# Patient Record
Sex: FEMALE | Race: BLACK OR AFRICAN AMERICAN | Employment: UNEMPLOYED | URBAN - METROPOLITAN AREA
[De-identification: names, ages, dates, MRNs, and addresses within clinical notes are randomized per-mention and may not be internally consistent; named-entity substitution may affect disease eponyms.]

---

## 2018-04-07 ENCOUNTER — APPOINTMENT (OUTPATIENT)
Dept: OCCUPATIONAL MEDICINE | Age: 23
End: 2018-04-07
Attending: FAMILY MEDICINE

## 2018-10-06 ENCOUNTER — HOSPITAL ENCOUNTER (EMERGENCY)
Facility: HOSPITAL | Age: 23
Discharge: LEFT BEFORE DISCHARGE | End: 2018-10-06
Attending: EMERGENCY MEDICINE
Payer: COMMERCIAL

## 2018-10-06 VITALS
HEIGHT: 62 IN | SYSTOLIC BLOOD PRESSURE: 134 MMHG | OXYGEN SATURATION: 96 % | TEMPERATURE: 99 F | HEART RATE: 89 BPM | DIASTOLIC BLOOD PRESSURE: 99 MMHG | BODY MASS INDEX: 40.12 KG/M2 | WEIGHT: 218 LBS | RESPIRATION RATE: 18 BRPM

## 2018-10-06 NOTE — ED INITIAL ASSESSMENT (HPI)
Pt reports that she had a positive preg test 3 months ago has not had any follow up with OB.  Reports vag bleeding for the past week

## 2018-10-09 ENCOUNTER — HOSPITAL ENCOUNTER (OUTPATIENT)
Facility: HOSPITAL | Age: 23
Setting detail: OBSERVATION
Discharge: HOME | End: 2018-10-09
Attending: OBSTETRICS & GYNECOLOGY | Admitting: OBSTETRICS & GYNECOLOGY
Payer: COMMERCIAL

## 2018-10-09 VITALS — DIASTOLIC BLOOD PRESSURE: 67 MMHG | HEART RATE: 84 BPM | SYSTOLIC BLOOD PRESSURE: 128 MMHG

## 2018-10-09 PROBLEM — N93.9 VAGINAL BLEEDING: Status: ACTIVE | Noted: 2018-10-09

## 2018-10-09 PROCEDURE — 99201 HC OUTPT EVAL AND MGNT NEW PT LEVEL 1: CPT

## 2018-10-10 NOTE — PROGRESS NOTES
Dr Herb Montes in to see pt. Bedside ultrasound done. FHT's 160 bpm. No bleeding noted. Per bedside ultrasound, Dr Herb Montes determining pt to be approx 12 weeks. Instructions to discharge to ER for further workup.  Pt discharged per wheelchair to ER

## 2018-10-10 NOTE — PROGRESS NOTES
[facility] - [facility]      Triage Note    Marylee Kocher Patient Status:  Observation    1995 MRN [id number]   Location [address] Attending No att. providers found   [facility] Day # 0 PCP No primary care provider on

## 2018-10-10 NOTE — PROGRESS NOTES
Pt arrived from ER with c/o vaginal bleeding. Pt states LMP 5/10/18 and that she has had very limited prenatal care.

## 2018-10-10 NOTE — TRIAGE
[facility]      Triage Note    Eddie Hyman Patient Status:  Observation    1995 MRN [id number]   Location [address]  Attending Raissa Smith MD   Hosp Day # 0 PCP No primary care provider on file

## 2019-08-17 ENCOUNTER — OFFICE VISIT (OUTPATIENT)
Dept: FAMILY MEDICINE CLINIC | Facility: CLINIC | Age: 24
End: 2019-08-17
Payer: MEDICAID

## 2019-08-17 VITALS
HEIGHT: 64 IN | WEIGHT: 218 LBS | BODY MASS INDEX: 37.22 KG/M2 | HEART RATE: 105 BPM | TEMPERATURE: 97 F | OXYGEN SATURATION: 98 % | DIASTOLIC BLOOD PRESSURE: 90 MMHG | SYSTOLIC BLOOD PRESSURE: 122 MMHG

## 2019-08-17 DIAGNOSIS — R30.0 DYSURIA: ICD-10-CM

## 2019-08-17 DIAGNOSIS — R82.90 ABNORMAL URINE ODOR: Primary | ICD-10-CM

## 2019-08-17 LAB
APPEARANCE: CLEAR
GLUCOSE (URINE DIPSTICK): NEGATIVE MG/DL
KETONES (URINE DIPSTICK): 15 MG/DL
MULTISTIX LOT#: ABNORMAL NUMERIC
NITRITE, URINE: NEGATIVE
OCCULT BLOOD: NEGATIVE
PH, URINE: 6 (ref 4.5–8)
PROTEIN (URINE DIPSTICK): 30 MG/DL
SPECIFIC GRAVITY: 1.02 (ref 1–1.03)
UROBILINOGEN,SEMI-QN: 0.2 MG/DL (ref 0–1.9)

## 2019-08-17 PROCEDURE — 87491 CHLMYD TRACH DNA AMP PROBE: CPT | Performed by: NURSE PRACTITIONER

## 2019-08-17 PROCEDURE — 99202 OFFICE O/P NEW SF 15 MIN: CPT | Performed by: NURSE PRACTITIONER

## 2019-08-17 PROCEDURE — 87086 URINE CULTURE/COLONY COUNT: CPT | Performed by: NURSE PRACTITIONER

## 2019-08-17 PROCEDURE — 87591 N.GONORRHOEAE DNA AMP PROB: CPT | Performed by: NURSE PRACTITIONER

## 2019-08-17 RX ORDER — SULFAMETHOXAZOLE AND TRIMETHOPRIM 800; 160 MG/1; MG/1
1 TABLET ORAL 2 TIMES DAILY
Qty: 14 TABLET | Refills: 0 | Status: SHIPPED | OUTPATIENT
Start: 2019-08-17 | End: 2019-08-24

## 2019-08-17 RX ORDER — PHENTERMINE HYDROCHLORIDE 37.5 MG/1
37.5 CAPSULE ORAL EVERY MORNING
COMMUNITY

## 2019-08-17 NOTE — PATIENT INSTRUCTIONS
Dysuria     Painful urination (dysuria) is often caused by a problem in the urinary tract. Dysuria is pain felt during urination. It is often described as a burning. Learn more about this problem and how it can be treated. What causes dysuria?   Possib · Rash or joint pain  · Increased back or abdominal pain  · Enlarged painful lymph nodes (lumps) in the groinTh   Date Last Reviewed: 1/1/2017  © 7701-8488 [employer]. [address] [facility], [address] [place] [address]. All rights reserved.  This info

## 2019-08-17 NOTE — PROGRESS NOTES
CHIEF COMPLAINT:   Patient presents with:  UTI: smells when she urinates, some discharge,  discomfort when urinating      HPI:   Cassandra Rodriges is a 24year old female who presents with symptoms of possible UTI.   Pt is C/O odor- she is unsure if coming LUNGS: denies shortness of breath, cough, or wheezing  GI: See HPI. No N/V/C/D. : See HPI. NEURO: no headaches. EXAM:   /90   Pulse 105   Temp 97.3 °F (36.3 °C) (Tympanic)   Ht 64\"   Wt 218 lb   LMP 07/22/2019   SpO2 98%   Breastfeeding?  No - Advised F/U visit with her GYN or IC if no improvement/worsening within 2-3 days.    - Advised ER if ever moderate to severe abdominal/flank pain or new fevers. - Pt verbalizes understanding and is agreeable w/ plan.     Meds & Refills for this Visit:  R How is dysuria treated? Treatment depends on the cause. If you have a bacterial infection, you may need antibiotics. You may be given medicines to make it easier for you to urinate and help relieve pain.  Your healthcare provider can tell you more about yo

## 2019-08-18 LAB
C TRACH DNA SPEC QL NAA+PROBE: NEGATIVE
N GONORRHOEA DNA SPEC QL NAA+PROBE: NEGATIVE

## 2023-05-24 ENCOUNTER — APPOINTMENT (OUTPATIENT)
Dept: GENERAL RADIOLOGY | Facility: HOSPITAL | Age: 28
End: 2023-05-24
Attending: EMERGENCY MEDICINE
Payer: COMMERCIAL

## 2023-05-24 ENCOUNTER — APPOINTMENT (OUTPATIENT)
Dept: CT IMAGING | Facility: HOSPITAL | Age: 28
End: 2023-05-24
Attending: EMERGENCY MEDICINE
Payer: COMMERCIAL

## 2023-05-24 ENCOUNTER — HOSPITAL ENCOUNTER (EMERGENCY)
Facility: HOSPITAL | Age: 28
Discharge: HOME | End: 2023-05-24
Attending: EMERGENCY MEDICINE
Payer: COMMERCIAL

## 2023-05-24 VITALS
SYSTOLIC BLOOD PRESSURE: 144 MMHG | TEMPERATURE: 97 F | HEART RATE: 84 BPM | BODY MASS INDEX: 37.54 KG/M2 | OXYGEN SATURATION: 99 % | HEIGHT: 62 IN | RESPIRATION RATE: 16 BRPM | DIASTOLIC BLOOD PRESSURE: 97 MMHG | WEIGHT: 204 LBS

## 2023-05-24 DIAGNOSIS — S00.81XA ABRASION OF FACE, INITIAL ENCOUNTER: ICD-10-CM
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
## 2023-05-24 PROCEDURE — 99284 EMERGENCY DEPT VISIT MOD MDM: CPT

## 2023-05-24 PROCEDURE — 73030 X-RAY EXAM OF SHOULDER: CPT | Performed by: EMERGENCY MEDICINE

## 2023-05-24 PROCEDURE — 72125 CT NECK SPINE W/O DYE: CPT | Performed by: EMERGENCY MEDICINE

## 2023-05-24 PROCEDURE — 72072 X-RAY EXAM THORAC SPINE 3VWS: CPT | Performed by: EMERGENCY MEDICINE

## 2023-05-24 PROCEDURE — 73590 X-RAY EXAM OF LOWER LEG: CPT | Performed by: EMERGENCY MEDICINE

## 2023-05-24 PROCEDURE — 73080 X-RAY EXAM OF ELBOW: CPT | Performed by: EMERGENCY MEDICINE

## 2023-05-24 PROCEDURE — 70450 CT HEAD/BRAIN W/O DYE: CPT | Performed by: EMERGENCY MEDICINE

## 2023-05-24 RX ORDER — HYDROCODONE BITARTRATE AND ACETAMINOPHEN 5; 325 MG/1; MG/1
1 TABLET ORAL ONCE
Status: COMPLETED | OUTPATIENT
Start: 2023-05-24 | End: 2023-05-24

## 2023-05-24 RX ORDER — HYDROCODONE BITARTRATE AND ACETAMINOPHEN 5; 325 MG/1; MG/1
1 TABLET ORAL EVERY 6 HOURS PRN
Qty: 15 TABLET | Refills: 0 | Status: SHIPPED | OUTPATIENT
Start: 2023-05-24 | End: 2023-05-31
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
## 2023-05-24 NOTE — DISCHARGE INSTRUCTIONS
Wash abrasions gently with regular soap and water. Hydrocodone for pain as needed, do not work or drive when taking as it can make you drowsy. Wear sling for comfort.

## 2023-05-29 ENCOUNTER — HOSPITAL ENCOUNTER (EMERGENCY)
Facility: HOSPITAL | Age: 28
Discharge: HOME | End: 2023-05-29
Attending: EMERGENCY MEDICINE
Payer: MEDICAID

## 2023-05-29 VITALS
OXYGEN SATURATION: 98 % | BODY MASS INDEX: 37.91 KG/M2 | DIASTOLIC BLOOD PRESSURE: 70 MMHG | WEIGHT: 206 LBS | TEMPERATURE: 98 F | SYSTOLIC BLOOD PRESSURE: 162 MMHG | HEART RATE: 80 BPM | HEIGHT: 62 IN | RESPIRATION RATE: 20 BRPM

## 2023-05-29 DIAGNOSIS — M62.830 LUMBAR PARASPINAL MUSCLE SPASM: ICD-10-CM

## 2023-05-29 DIAGNOSIS — M62.830 SPASM OF THORACIC BACK MUSCLE: Primary | ICD-10-CM

## 2023-05-29 PROCEDURE — 99284 EMERGENCY DEPT VISIT MOD MDM: CPT

## 2023-05-29 PROCEDURE — 99283 EMERGENCY DEPT VISIT LOW MDM: CPT

## 2023-05-29 RX ORDER — IBUPROFEN 200 MG
400 TABLET ORAL ONCE
Status: COMPLETED | OUTPATIENT
Start: 2023-05-29 | End: 2023-05-29

## 2023-05-29 RX ORDER — ACETAMINOPHEN 500 MG
1000 TABLET ORAL ONCE
Status: COMPLETED | OUTPATIENT
Start: 2023-05-29 | End: 2023-05-29

## 2023-05-29 RX ORDER — CYCLOBENZAPRINE HCL 10 MG
10 TABLET ORAL 3 TIMES DAILY PRN
Qty: 10 TABLET | Refills: 0 | Status: SHIPPED | OUTPATIENT
Start: 2023-05-29

## 2023-05-29 NOTE — DISCHARGE INSTRUCTIONS
Warm compresses topically to your back 20 minutes every 2 hours while awake. Avoid heating pads that can burn the skin  Ibuprofen 400 mg 3 times a day with food for 3 to 5 days, alternate with 1 g of Tylenol twice daily  Flexeril for muscle spasm. Do not drink alcohol or drive while on Flexeril may make you drowsy  Return if worse  Follow up with primary care physician, I contacted our  who will assist you in finding physician you can follow-up with. You may need some physical therapy.

## 2023-05-30 NOTE — CM/SW NOTE
Called in script for cyclobenzaprine since [facility] pharmacy called for patient since they did not receive anything electronically. Cyclobenzaprine 10 mg Take 1 tablet (10 mg total) by mouth 3 (three) times daily as needed for Muscle spasms. No refills.

## 2023-05-30 NOTE — CM/SW NOTE
Left message for Paula Giles at the [facility] to follow up with patient for a primary care appointment.

## 2023-06-01 ENCOUNTER — ORDER TRANSCRIPTION (OUTPATIENT)
Dept: PHYSICAL THERAPY | Facility: HOSPITAL | Age: 28
End: 2023-06-01

## 2023-06-01 DIAGNOSIS — M25.522 LEFT ELBOW PAIN: Primary | ICD-10-CM

## 2023-06-01 DIAGNOSIS — M54.42 ACUTE BACK PAIN WITH SCIATICA, LEFT: ICD-10-CM

## 2023-06-01 DIAGNOSIS — M62.838 SPASM OF MUSCLE: ICD-10-CM

## 2023-06-08 ENCOUNTER — TELEPHONE (OUTPATIENT)
Dept: PHYSICAL THERAPY | Facility: HOSPITAL | Age: 28
End: 2023-06-08

## 2023-07-28 ENCOUNTER — APPOINTMENT (OUTPATIENT)
Dept: ULTRASOUND IMAGING | Facility: HOSPITAL | Age: 28
End: 2023-07-28
Attending: EMERGENCY MEDICINE
Payer: MEDICAID

## 2023-07-28 ENCOUNTER — HOSPITAL ENCOUNTER (EMERGENCY)
Facility: HOSPITAL | Age: 28
Discharge: HOME | End: 2023-07-28
Attending: EMERGENCY MEDICINE
Payer: MEDICAID

## 2023-07-28 VITALS
HEART RATE: 74 BPM | HEIGHT: 62 IN | WEIGHT: 226 LBS | TEMPERATURE: 98 F | SYSTOLIC BLOOD PRESSURE: 103 MMHG | OXYGEN SATURATION: 100 % | DIASTOLIC BLOOD PRESSURE: 62 MMHG | RESPIRATION RATE: 15 BRPM | BODY MASS INDEX: 41.59 KG/M2

## 2023-07-28 DIAGNOSIS — R10.2 PELVIC PAIN IN PREGNANCY: Primary | ICD-10-CM

## 2023-07-28 DIAGNOSIS — O26.899 PELVIC PAIN IN PREGNANCY: Primary | ICD-10-CM

## 2023-07-28 DIAGNOSIS — N39.0 URINARY TRACT INFECTION WITHOUT HEMATURIA, SITE UNSPECIFIED: ICD-10-CM

## 2023-07-28 PROBLEM — O09.291 HISTORY OF PRE-ECLAMPSIA IN PRIOR PREGNANCY, CURRENTLY PREGNANT IN FIRST TRIMESTER (HCC): Status: ACTIVE | Noted: 2023-07-28

## 2023-07-28 PROBLEM — E66.01 MATERNAL MORBID OBESITY IN FIRST TRIMESTER, ANTEPARTUM (HCC): Status: ACTIVE | Noted: 2023-07-28

## 2023-07-28 PROBLEM — Z34.81 PRENATAL CARE, SUBSEQUENT PREGNANCY IN FIRST TRIMESTER (HCC): Status: ACTIVE | Noted: 2023-07-28

## 2023-07-28 PROBLEM — O99.211 MATERNAL MORBID OBESITY IN FIRST TRIMESTER, ANTEPARTUM (HCC): Status: ACTIVE | Noted: 2023-07-28

## 2023-07-28 PROBLEM — Z34.81 PRENATAL CARE, SUBSEQUENT PREGNANCY IN FIRST TRIMESTER: Status: ACTIVE | Noted: 2023-07-28

## 2023-07-28 PROBLEM — O09.291 HISTORY OF PRE-ECLAMPSIA IN PRIOR PREGNANCY, CURRENTLY PREGNANT IN FIRST TRIMESTER: Status: ACTIVE | Noted: 2023-07-28

## 2023-07-28 PROBLEM — N93.9 VAGINAL BLEEDING: Status: RESOLVED | Noted: 2018-10-09 | Resolved: 2023-07-28

## 2023-07-28 PROBLEM — O36.80X0 PREGNANCY WITH UNCERTAIN FETAL VIABILITY (HCC): Status: ACTIVE | Noted: 2023-07-28

## 2023-07-28 PROBLEM — O34.219 PREGNANCY WITH HISTORY OF CESAREAN SECTION, ANTEPARTUM (HCC): Status: ACTIVE | Noted: 2023-07-28

## 2023-07-28 PROBLEM — O34.219 PREGNANCY WITH HISTORY OF CESAREAN SECTION, ANTEPARTUM: Status: ACTIVE | Noted: 2023-07-28

## 2023-07-28 PROBLEM — O36.80X0 PREGNANCY WITH UNCERTAIN FETAL VIABILITY: Status: ACTIVE | Noted: 2023-07-28

## 2023-07-28 LAB
ANION GAP SERPL CALC-SCNC: 6 MMOL/L (ref 0–18)
B-HCG SERPL-ACNC: 3639 MIU/ML
B-HCG UR QL: POSITIVE
BASOPHILS # BLD AUTO: 0.03 X10(3) UL (ref 0–0.2)
BASOPHILS NFR BLD AUTO: 0.3 %
BILIRUB UR QL STRIP.AUTO: NEGATIVE
BUN BLD-MCNC: 8 MG/DL (ref 7–18)
CALCIUM BLD-MCNC: 9 MG/DL (ref 8.5–10.1)
CHLORIDE SERPL-SCNC: 109 MMOL/L (ref 98–112)
CO2 SERPL-SCNC: 23 MMOL/L (ref 21–32)
COLOR UR AUTO: YELLOW
CREAT BLD-MCNC: 0.72 MG/DL
EGFRCR SERPLBLD CKD-EPI 2021: 117 ML/MIN/1.73M2 (ref 60–?)
EOSINOPHIL # BLD AUTO: 0.1 X10(3) UL (ref 0–0.7)
EOSINOPHIL NFR BLD AUTO: 1.1 %
ERYTHROCYTE [DISTWIDTH] IN BLOOD BY AUTOMATED COUNT: 14.6 %
GLUCOSE BLD-MCNC: 94 MG/DL (ref 70–99)
GLUCOSE UR STRIP.AUTO-MCNC: NEGATIVE MG/DL
HCT VFR BLD AUTO: 36.8 %
HGB BLD-MCNC: 12.7 G/DL
HYALINE CASTS #/AREA URNS AUTO: PRESENT /LPF
IMM GRANULOCYTES # BLD AUTO: 0.05 X10(3) UL (ref 0–1)
IMM GRANULOCYTES NFR BLD: 0.5 %
KETONES UR STRIP.AUTO-MCNC: NEGATIVE MG/DL
LYMPHOCYTES # BLD AUTO: 2.29 X10(3) UL (ref 1–4)
LYMPHOCYTES NFR BLD AUTO: 24.7 %
MCH RBC QN AUTO: 27.2 PG (ref 26–34)
MCHC RBC AUTO-ENTMCNC: 34.5 G/DL (ref 31–37)
MCV RBC AUTO: 78.8 FL
MONOCYTES # BLD AUTO: 0.61 X10(3) UL (ref 0.1–1)
MONOCYTES NFR BLD AUTO: 6.6 %
NEUTROPHILS # BLD AUTO: 6.19 X10 (3) UL (ref 1.5–7.7)
NEUTROPHILS # BLD AUTO: 6.19 X10(3) UL (ref 1.5–7.7)
NEUTROPHILS NFR BLD AUTO: 66.8 %
NITRITE UR QL STRIP.AUTO: POSITIVE
OSMOLALITY SERPL CALC.SUM OF ELEC: 284 MOSM/KG (ref 275–295)
PH UR STRIP.AUTO: 5 [PH] (ref 5–8)
PLATELET # BLD AUTO: 343 10(3)UL (ref 150–450)
POTASSIUM SERPL-SCNC: 3.7 MMOL/L (ref 3.5–5.1)
PROT UR STRIP.AUTO-MCNC: 30 MG/DL
RBC # BLD AUTO: 4.67 X10(6)UL
RBC #/AREA URNS AUTO: >10 /HPF
RBC UR QL AUTO: NEGATIVE
RH BLOOD TYPE: POSITIVE
SODIUM SERPL-SCNC: 138 MMOL/L (ref 136–145)
SP GR UR STRIP.AUTO: 1.02 (ref 1–1.03)
UROBILINOGEN UR STRIP.AUTO-MCNC: <2 MG/DL
WBC # BLD AUTO: 9.3 X10(3) UL (ref 4–11)
WBC #/AREA URNS AUTO: >50 /HPF

## 2023-07-28 PROCEDURE — 96360 HYDRATION IV INFUSION INIT: CPT

## 2023-07-28 PROCEDURE — 86901 BLOOD TYPING SEROLOGIC RH(D): CPT | Performed by: EMERGENCY MEDICINE

## 2023-07-28 PROCEDURE — 87077 CULTURE AEROBIC IDENTIFY: CPT | Performed by: EMERGENCY MEDICINE

## 2023-07-28 PROCEDURE — 87186 SC STD MICRODIL/AGAR DIL: CPT | Performed by: EMERGENCY MEDICINE

## 2023-07-28 PROCEDURE — 76801 OB US < 14 WKS SINGLE FETUS: CPT | Performed by: EMERGENCY MEDICINE

## 2023-07-28 PROCEDURE — 81001 URINALYSIS AUTO W/SCOPE: CPT | Performed by: EMERGENCY MEDICINE

## 2023-07-28 PROCEDURE — 99285 EMERGENCY DEPT VISIT HI MDM: CPT

## 2023-07-28 PROCEDURE — 87086 URINE CULTURE/COLONY COUNT: CPT | Performed by: EMERGENCY MEDICINE

## 2023-07-28 PROCEDURE — 86900 BLOOD TYPING SEROLOGIC ABO: CPT | Performed by: EMERGENCY MEDICINE

## 2023-07-28 PROCEDURE — 85025 COMPLETE CBC W/AUTO DIFF WBC: CPT | Performed by: EMERGENCY MEDICINE

## 2023-07-28 PROCEDURE — 99284 EMERGENCY DEPT VISIT MOD MDM: CPT

## 2023-07-28 PROCEDURE — 80048 BASIC METABOLIC PNL TOTAL CA: CPT | Performed by: EMERGENCY MEDICINE

## 2023-07-28 PROCEDURE — 81025 URINE PREGNANCY TEST: CPT

## 2023-07-28 PROCEDURE — 84702 CHORIONIC GONADOTROPIN TEST: CPT | Performed by: EMERGENCY MEDICINE

## 2023-07-28 RX ORDER — ONDANSETRON 4 MG/1
4 TABLET, ORALLY DISINTEGRATING ORAL ONCE
Status: DISCONTINUED | OUTPATIENT
Start: 2023-07-28 | End: 2023-07-28

## 2023-07-28 RX ORDER — CEPHALEXIN 500 MG/1
500 CAPSULE ORAL 4 TIMES DAILY
Qty: 40 CAPSULE | Refills: 0 | Status: SHIPPED | OUTPATIENT
Start: 2023-07-28 | End: 2023-07-28

## 2023-07-28 RX ORDER — CEPHALEXIN 500 MG/1
500 CAPSULE ORAL 4 TIMES DAILY
Qty: 40 CAPSULE | Refills: 0 | Status: SHIPPED | OUTPATIENT
Start: 2023-07-28 | End: 2023-08-07

## 2023-07-28 NOTE — ED INITIAL ASSESSMENT (HPI)
Patient was in [facility] on may 25th. Placed on pain medications @ that time. Patient complains of generalized pain & dizziness.

## 2023-07-29 NOTE — ED QUICK NOTES
Pt reevaluated by er physician. Pt informed of her test report and plan of care.  Verbalizing undersatanding

## 2023-08-01 ENCOUNTER — TELEPHONE (OUTPATIENT)
Facility: CLINIC | Age: 28
End: 2023-08-01

## 2023-08-01 NOTE — TELEPHONE ENCOUNTER
Called the pt to verify a follow up appt has been scheduled after ER visit. Pt confirmed the appt has been scheduled with her provider.

## 2023-08-15 PROCEDURE — 99284 EMERGENCY DEPT VISIT MOD MDM: CPT

## 2023-08-15 PROCEDURE — 99285 EMERGENCY DEPT VISIT HI MDM: CPT

## 2023-08-16 ENCOUNTER — HOSPITAL ENCOUNTER (EMERGENCY)
Facility: HOSPITAL | Age: 28
Discharge: HOME | End: 2023-08-16
Attending: EMERGENCY MEDICINE
Payer: MEDICAID

## 2023-08-16 ENCOUNTER — APPOINTMENT (OUTPATIENT)
Dept: ULTRASOUND IMAGING | Facility: HOSPITAL | Age: 28
End: 2023-08-16
Attending: EMERGENCY MEDICINE
Payer: MEDICAID

## 2023-08-16 VITALS
OXYGEN SATURATION: 97 % | TEMPERATURE: 98 F | BODY MASS INDEX: 50 KG/M2 | SYSTOLIC BLOOD PRESSURE: 136 MMHG | WEIGHT: 273 LBS | HEART RATE: 99 BPM | RESPIRATION RATE: 20 BRPM | DIASTOLIC BLOOD PRESSURE: 81 MMHG

## 2023-08-16 DIAGNOSIS — O03.9 MISCARRIAGE: Primary | ICD-10-CM

## 2023-08-16 LAB
ALBUMIN SERPL-MCNC: 3.7 G/DL (ref 3.4–5)
ALBUMIN/GLOB SERPL: 0.9 {RATIO} (ref 1–2)
ALP LIVER SERPL-CCNC: 118 U/L
ALT SERPL-CCNC: 24 U/L
ANION GAP SERPL CALC-SCNC: 4 MMOL/L (ref 0–18)
AST SERPL-CCNC: 16 U/L (ref 15–37)
B-HCG SERPL-ACNC: 90 MIU/ML
B-HCG UR QL: POSITIVE
BASOPHILS # BLD AUTO: 0.03 X10(3) UL (ref 0–0.2)
BASOPHILS NFR BLD AUTO: 0.3 %
BILIRUB SERPL-MCNC: 0.2 MG/DL (ref 0.1–2)
BILIRUB UR QL STRIP.AUTO: NEGATIVE
BUN BLD-MCNC: 12 MG/DL (ref 7–18)
CALCIUM BLD-MCNC: 9.1 MG/DL (ref 8.5–10.1)
CHLORIDE SERPL-SCNC: 110 MMOL/L (ref 98–112)
CO2 SERPL-SCNC: 24 MMOL/L (ref 21–32)
COLOR UR AUTO: YELLOW
CREAT BLD-MCNC: 0.88 MG/DL
EGFRCR SERPLBLD CKD-EPI 2021: 92 ML/MIN/1.73M2 (ref 60–?)
EOSINOPHIL # BLD AUTO: 0.11 X10(3) UL (ref 0–0.7)
EOSINOPHIL NFR BLD AUTO: 1.1 %
ERYTHROCYTE [DISTWIDTH] IN BLOOD BY AUTOMATED COUNT: 14.2 %
GLOBULIN PLAS-MCNC: 4 G/DL (ref 2.8–4.4)
GLUCOSE BLD-MCNC: 106 MG/DL (ref 70–99)
GLUCOSE UR STRIP.AUTO-MCNC: NORMAL MG/DL
HCT VFR BLD AUTO: 36.5 %
HGB BLD-MCNC: 12.8 G/DL
IMM GRANULOCYTES # BLD AUTO: 0.03 X10(3) UL (ref 0–1)
IMM GRANULOCYTES NFR BLD: 0.3 %
KETONES UR STRIP.AUTO-MCNC: NEGATIVE MG/DL
LEUKOCYTE ESTERASE UR QL STRIP.AUTO: 500
LYMPHOCYTES # BLD AUTO: 2.38 X10(3) UL (ref 1–4)
LYMPHOCYTES NFR BLD AUTO: 24.4 %
MCH RBC QN AUTO: 27.9 PG (ref 26–34)
MCHC RBC AUTO-ENTMCNC: 35.1 G/DL (ref 31–37)
MCV RBC AUTO: 79.7 FL
MONOCYTES # BLD AUTO: 0.64 X10(3) UL (ref 0.1–1)
MONOCYTES NFR BLD AUTO: 6.6 %
NEUTROPHILS # BLD AUTO: 6.57 X10 (3) UL (ref 1.5–7.7)
NEUTROPHILS # BLD AUTO: 6.57 X10(3) UL (ref 1.5–7.7)
NEUTROPHILS NFR BLD AUTO: 67.3 %
NITRITE UR QL STRIP.AUTO: NEGATIVE
OSMOLALITY SERPL CALC.SUM OF ELEC: 286 MOSM/KG (ref 275–295)
PH UR STRIP.AUTO: 5.5 [PH] (ref 5–8)
PLATELET # BLD AUTO: 335 10(3)UL (ref 150–450)
POTASSIUM SERPL-SCNC: 3.8 MMOL/L (ref 3.5–5.1)
PROT SERPL-MCNC: 7.7 G/DL (ref 6.4–8.2)
PROT UR STRIP.AUTO-MCNC: 20 MG/DL
RBC # BLD AUTO: 4.58 X10(6)UL
RBC #/AREA URNS AUTO: >10 /HPF
RH BLOOD TYPE: POSITIVE
SODIUM SERPL-SCNC: 138 MMOL/L (ref 136–145)
SP GR UR STRIP.AUTO: 1.02 (ref 1–1.03)
UROBILINOGEN UR STRIP.AUTO-MCNC: NORMAL MG/DL
WBC # BLD AUTO: 9.8 X10(3) UL (ref 4–11)
WBC #/AREA URNS AUTO: >50 /HPF

## 2023-08-16 PROCEDURE — 81025 URINE PREGNANCY TEST: CPT

## 2023-08-16 PROCEDURE — 76801 OB US < 14 WKS SINGLE FETUS: CPT | Performed by: EMERGENCY MEDICINE

## 2023-08-16 PROCEDURE — 81001 URINALYSIS AUTO W/SCOPE: CPT | Performed by: EMERGENCY MEDICINE

## 2023-08-16 PROCEDURE — 85025 COMPLETE CBC W/AUTO DIFF WBC: CPT | Performed by: EMERGENCY MEDICINE

## 2023-08-16 PROCEDURE — 86900 BLOOD TYPING SEROLOGIC ABO: CPT | Performed by: EMERGENCY MEDICINE

## 2023-08-16 PROCEDURE — 86901 BLOOD TYPING SEROLOGIC RH(D): CPT | Performed by: EMERGENCY MEDICINE

## 2023-08-16 PROCEDURE — 96360 HYDRATION IV INFUSION INIT: CPT

## 2023-08-16 PROCEDURE — 80053 COMPREHEN METABOLIC PANEL: CPT | Performed by: EMERGENCY MEDICINE

## 2023-08-16 PROCEDURE — 84702 CHORIONIC GONADOTROPIN TEST: CPT | Performed by: EMERGENCY MEDICINE

## 2023-08-16 PROCEDURE — 87086 URINE CULTURE/COLONY COUNT: CPT | Performed by: EMERGENCY MEDICINE

## 2023-08-16 NOTE — ED INITIAL ASSESSMENT (HPI)
27YF c/c of laurie SÁNCHEZ Pt state that she been having vaginal bleeding for the last 9 days. Pt state she currently about 13 weeks preg. Pt state she been having cramping.

## 2023-11-30 ENCOUNTER — HOSPITAL ENCOUNTER (EMERGENCY)
Facility: HOSPITAL | Age: 28
Discharge: HOME | End: 2023-12-01
Attending: EMERGENCY MEDICINE
Payer: MEDICAID

## 2023-11-30 VITALS
WEIGHT: 212 LBS | RESPIRATION RATE: 18 BRPM | HEART RATE: 85 BPM | TEMPERATURE: 97 F | BODY MASS INDEX: 37.56 KG/M2 | DIASTOLIC BLOOD PRESSURE: 85 MMHG | HEIGHT: 63 IN | SYSTOLIC BLOOD PRESSURE: 146 MMHG | OXYGEN SATURATION: 99 %

## 2023-11-30 DIAGNOSIS — N73.0 PID (ACUTE PELVIC INFLAMMATORY DISEASE): Primary | ICD-10-CM

## 2023-11-30 LAB
ALBUMIN SERPL-MCNC: 3.8 G/DL (ref 3.4–5)
ALBUMIN/GLOB SERPL: 1 {RATIO} (ref 1–2)
ALP LIVER SERPL-CCNC: 95 U/L
ALT SERPL-CCNC: 16 U/L
ANION GAP SERPL CALC-SCNC: 2 MMOL/L (ref 0–18)
AST SERPL-CCNC: 11 U/L (ref 15–37)
B-HCG UR QL: NEGATIVE
BASOPHILS # BLD AUTO: 0.04 X10(3) UL (ref 0–0.2)
BASOPHILS NFR BLD AUTO: 0.5 %
BILIRUB SERPL-MCNC: 0.5 MG/DL (ref 0.1–2)
BILIRUB UR QL STRIP.AUTO: NEGATIVE
BUN BLD-MCNC: 9 MG/DL (ref 9–23)
CALCIUM BLD-MCNC: 8.7 MG/DL (ref 8.5–10.1)
CHLORIDE SERPL-SCNC: 108 MMOL/L (ref 98–112)
CLARITY UR REFRACT.AUTO: CLEAR
CO2 SERPL-SCNC: 28 MMOL/L (ref 21–32)
CREAT BLD-MCNC: 0.83 MG/DL
EGFRCR SERPLBLD CKD-EPI 2021: 98 ML/MIN/1.73M2 (ref 60–?)
EOSINOPHIL # BLD AUTO: 0.05 X10(3) UL (ref 0–0.7)
EOSINOPHIL NFR BLD AUTO: 0.7 %
ERYTHROCYTE [DISTWIDTH] IN BLOOD BY AUTOMATED COUNT: 14.7 %
GLOBULIN PLAS-MCNC: 4 G/DL (ref 2.8–4.4)
GLUCOSE BLD-MCNC: 99 MG/DL (ref 70–99)
GLUCOSE UR STRIP.AUTO-MCNC: NORMAL MG/DL
HCT VFR BLD AUTO: 37.8 %
HGB BLD-MCNC: 12.6 G/DL
IMM GRANULOCYTES # BLD AUTO: 0.02 X10(3) UL (ref 0–1)
IMM GRANULOCYTES NFR BLD: 0.3 %
KETONES UR STRIP.AUTO-MCNC: NEGATIVE MG/DL
LEUKOCYTE ESTERASE UR QL STRIP.AUTO: 250
LIPASE SERPL-CCNC: 30 U/L (ref 13–75)
LYMPHOCYTES # BLD AUTO: 2.39 X10(3) UL (ref 1–4)
LYMPHOCYTES NFR BLD AUTO: 32.1 %
MCH RBC QN AUTO: 25.8 PG (ref 26–34)
MCHC RBC AUTO-ENTMCNC: 33.3 G/DL (ref 31–37)
MCV RBC AUTO: 77.5 FL
MONOCYTES # BLD AUTO: 0.67 X10(3) UL (ref 0.1–1)
MONOCYTES NFR BLD AUTO: 9 %
NEUTROPHILS # BLD AUTO: 4.28 X10 (3) UL (ref 1.5–7.7)
NEUTROPHILS # BLD AUTO: 4.28 X10(3) UL (ref 1.5–7.7)
NEUTROPHILS NFR BLD AUTO: 57.4 %
NITRITE UR QL STRIP.AUTO: NEGATIVE
OSMOLALITY SERPL CALC.SUM OF ELEC: 285 MOSM/KG (ref 275–295)
PH UR STRIP.AUTO: 7 [PH] (ref 5–8)
PLATELET # BLD AUTO: 354 10(3)UL (ref 150–450)
POTASSIUM SERPL-SCNC: 3.6 MMOL/L (ref 3.5–5.1)
PROT SERPL-MCNC: 7.8 G/DL (ref 6.4–8.2)
PROT UR STRIP.AUTO-MCNC: NEGATIVE MG/DL
RBC # BLD AUTO: 4.88 X10(6)UL
RBC UR QL AUTO: NEGATIVE
SODIUM SERPL-SCNC: 138 MMOL/L (ref 136–145)
SP GR UR STRIP.AUTO: 1.02 (ref 1–1.03)
UROBILINOGEN UR STRIP.AUTO-MCNC: NORMAL MG/DL
WBC # BLD AUTO: 7.5 X10(3) UL (ref 4–11)

## 2023-11-30 PROCEDURE — 99283 EMERGENCY DEPT VISIT LOW MDM: CPT

## 2023-11-30 PROCEDURE — 85025 COMPLETE CBC W/AUTO DIFF WBC: CPT | Performed by: EMERGENCY MEDICINE

## 2023-11-30 PROCEDURE — 99284 EMERGENCY DEPT VISIT MOD MDM: CPT

## 2023-11-30 PROCEDURE — 81001 URINALYSIS AUTO W/SCOPE: CPT | Performed by: EMERGENCY MEDICINE

## 2023-11-30 PROCEDURE — 83690 ASSAY OF LIPASE: CPT | Performed by: EMERGENCY MEDICINE

## 2023-11-30 PROCEDURE — 36415 COLL VENOUS BLD VENIPUNCTURE: CPT

## 2023-11-30 PROCEDURE — 87491 CHLMYD TRACH DNA AMP PROBE: CPT | Performed by: EMERGENCY MEDICINE

## 2023-11-30 PROCEDURE — 83690 ASSAY OF LIPASE: CPT

## 2023-11-30 PROCEDURE — 80053 COMPREHEN METABOLIC PANEL: CPT | Performed by: EMERGENCY MEDICINE

## 2023-11-30 PROCEDURE — 87591 N.GONORRHOEAE DNA AMP PROB: CPT | Performed by: EMERGENCY MEDICINE

## 2023-11-30 PROCEDURE — 85025 COMPLETE CBC W/AUTO DIFF WBC: CPT

## 2023-11-30 PROCEDURE — 87086 URINE CULTURE/COLONY COUNT: CPT | Performed by: EMERGENCY MEDICINE

## 2023-11-30 PROCEDURE — 80053 COMPREHEN METABOLIC PANEL: CPT

## 2023-11-30 PROCEDURE — 96372 THER/PROPH/DIAG INJ SC/IM: CPT

## 2023-11-30 PROCEDURE — 81025 URINE PREGNANCY TEST: CPT

## 2023-11-30 PROCEDURE — 81001 URINALYSIS AUTO W/SCOPE: CPT

## 2023-11-30 RX ORDER — CEPHALEXIN 500 MG/1
500 CAPSULE ORAL 2 TIMES DAILY
Qty: 10 CAPSULE | Refills: 0 | Status: SHIPPED | OUTPATIENT
Start: 2023-11-30 | End: 2023-12-05

## 2023-11-30 RX ORDER — DOXYCYCLINE HYCLATE 100 MG/1
100 CAPSULE ORAL 2 TIMES DAILY
Qty: 14 CAPSULE | Refills: 0 | Status: SHIPPED | OUTPATIENT
Start: 2023-11-30 | End: 2023-12-07

## 2023-11-30 RX ORDER — DOXYCYCLINE HYCLATE 100 MG/1
100 CAPSULE ORAL ONCE
Status: COMPLETED | OUTPATIENT
Start: 2023-11-30 | End: 2023-12-01

## 2023-12-01 NOTE — DISCHARGE INSTRUCTIONS
Refrain from having intercourse with your sexual partner until least 1 week after both of you have been treated. Otherwise you can continue to given STI to each other. Return if you develop any worrisome symptoms such as worsening pain, etc.  Otherwise follow-up with primary care as an outpatient.

## 2023-12-04 LAB
C TRACH DNA SPEC QL NAA+PROBE: NEGATIVE
N GONORRHOEA DNA SPEC QL NAA+PROBE: NEGATIVE

## 2024-03-11 ENCOUNTER — APPOINTMENT (OUTPATIENT)
Dept: MRI IMAGING | Facility: HOSPITAL | Age: 29
End: 2024-03-11
Attending: EMERGENCY MEDICINE
Payer: MEDICAID

## 2024-03-11 ENCOUNTER — HOSPITAL ENCOUNTER (EMERGENCY)
Facility: HOSPITAL | Age: 29
Discharge: HOME | End: 2024-03-11
Attending: EMERGENCY MEDICINE
Payer: MEDICAID

## 2024-03-11 ENCOUNTER — APPOINTMENT (OUTPATIENT)
Dept: CT IMAGING | Facility: HOSPITAL | Age: 29
End: 2024-03-11
Attending: EMERGENCY MEDICINE
Payer: MEDICAID

## 2024-03-11 ENCOUNTER — APPOINTMENT (OUTPATIENT)
Dept: GENERAL RADIOLOGY | Facility: HOSPITAL | Age: 29
End: 2024-03-11
Attending: EMERGENCY MEDICINE
Payer: MEDICAID

## 2024-03-11 VITALS
DIASTOLIC BLOOD PRESSURE: 82 MMHG | TEMPERATURE: 98 F | OXYGEN SATURATION: 100 % | WEIGHT: 236.31 LBS | BODY MASS INDEX: 42 KG/M2 | RESPIRATION RATE: 18 BRPM | SYSTOLIC BLOOD PRESSURE: 122 MMHG | HEART RATE: 74 BPM

## 2024-03-11 DIAGNOSIS — R29.90 STROKE-LIKE SYMPTOMS: Primary | ICD-10-CM

## 2024-03-11 DIAGNOSIS — R03.0 ELEVATED BLOOD PRESSURE READING: ICD-10-CM

## 2024-03-11 LAB
ALBUMIN SERPL-MCNC: 3.7 G/DL (ref 3.4–5)
ALBUMIN/GLOB SERPL: 0.9 {RATIO} (ref 1–2)
ALP LIVER SERPL-CCNC: 103 U/L
ALT SERPL-CCNC: 18 U/L
ANION GAP SERPL CALC-SCNC: 5 MMOL/L (ref 0–18)
APTT PPP: 27.8 SECONDS (ref 23.3–35.6)
AST SERPL-CCNC: 11 U/L (ref 15–37)
B-HCG UR QL: NEGATIVE
BASOPHILS # BLD AUTO: 0.05 X10(3) UL (ref 0–0.2)
BASOPHILS NFR BLD AUTO: 0.8 %
BILIRUB SERPL-MCNC: 0.4 MG/DL (ref 0.1–2)
BILIRUB UR QL STRIP.AUTO: NEGATIVE
BUN BLD-MCNC: 10 MG/DL (ref 9–23)
CALCIUM BLD-MCNC: 9.1 MG/DL (ref 8.5–10.1)
CHLORIDE SERPL-SCNC: 112 MMOL/L (ref 98–112)
CLARITY UR REFRACT.AUTO: CLEAR
CO2 SERPL-SCNC: 24 MMOL/L (ref 21–32)
COLOR UR AUTO: COLORLESS
CREAT BLD-MCNC: 0.79 MG/DL
EGFRCR SERPLBLD CKD-EPI 2021: 104 ML/MIN/1.73M2 (ref 60–?)
EOSINOPHIL # BLD AUTO: 0.12 X10(3) UL (ref 0–0.7)
EOSINOPHIL NFR BLD AUTO: 1.9 %
ERYTHROCYTE [DISTWIDTH] IN BLOOD BY AUTOMATED COUNT: 16.5 %
GLOBULIN PLAS-MCNC: 3.9 G/DL (ref 2.8–4.4)
GLUCOSE BLD-MCNC: 114 MG/DL (ref 70–99)
GLUCOSE BLD-MCNC: 84 MG/DL (ref 70–99)
GLUCOSE BLD-MCNC: 84 MG/DL (ref 70–99)
GLUCOSE UR STRIP.AUTO-MCNC: NORMAL MG/DL
HCT VFR BLD AUTO: 36.1 %
HGB BLD-MCNC: 11.8 G/DL
IMM GRANULOCYTES # BLD AUTO: 0.01 X10(3) UL (ref 0–1)
IMM GRANULOCYTES NFR BLD: 0.2 %
INR BLD: 0.99 (ref 0.8–1.2)
KETONES UR STRIP.AUTO-MCNC: NEGATIVE MG/DL
LEUKOCYTE ESTERASE UR QL STRIP.AUTO: 75
LYMPHOCYTES # BLD AUTO: 1.92 X10(3) UL (ref 1–4)
LYMPHOCYTES NFR BLD AUTO: 29.7 %
MAGNESIUM SERPL-MCNC: 1.9 MG/DL (ref 1.6–2.6)
MCH RBC QN AUTO: 23.2 PG (ref 26–34)
MCHC RBC AUTO-ENTMCNC: 32.7 G/DL (ref 31–37)
MCV RBC AUTO: 70.9 FL
MONOCYTES # BLD AUTO: 0.67 X10(3) UL (ref 0.1–1)
MONOCYTES NFR BLD AUTO: 10.4 %
NEUTROPHILS # BLD AUTO: 3.69 X10 (3) UL (ref 1.5–7.7)
NEUTROPHILS # BLD AUTO: 3.69 X10(3) UL (ref 1.5–7.7)
NEUTROPHILS NFR BLD AUTO: 57 %
NITRITE UR QL STRIP.AUTO: NEGATIVE
OSMOLALITY SERPL CALC.SUM OF ELEC: 290 MOSM/KG (ref 275–295)
PH UR STRIP.AUTO: 6.5 [PH] (ref 5–8)
PLATELET # BLD AUTO: 365 10(3)UL (ref 150–450)
POTASSIUM SERPL-SCNC: 3.5 MMOL/L (ref 3.5–5.1)
PROT SERPL-MCNC: 7.6 G/DL (ref 6.4–8.2)
PROTHROMBIN TIME: 13.1 SECONDS (ref 11.6–14.8)
RBC # BLD AUTO: 5.09 X10(6)UL
RBC #/AREA URNS AUTO: >10 /HPF
SODIUM SERPL-SCNC: 141 MMOL/L (ref 136–145)
SP GR UR STRIP.AUTO: >1.03 (ref 1–1.03)
TROPONIN I SERPL HS-MCNC: 3 NG/L
TSI SER-ACNC: 0.82 MIU/ML (ref 0.36–3.74)
UROBILINOGEN UR STRIP.AUTO-MCNC: NORMAL MG/DL
WBC # BLD AUTO: 6.5 X10(3) UL (ref 4–11)

## 2024-03-11 PROCEDURE — 87186 SC STD MICRODIL/AGAR DIL: CPT | Performed by: EMERGENCY MEDICINE

## 2024-03-11 PROCEDURE — 70498 CT ANGIOGRAPHY NECK: CPT | Performed by: EMERGENCY MEDICINE

## 2024-03-11 PROCEDURE — 87088 URINE BACTERIA CULTURE: CPT | Performed by: EMERGENCY MEDICINE

## 2024-03-11 PROCEDURE — 70496 CT ANGIOGRAPHY HEAD: CPT | Performed by: EMERGENCY MEDICINE

## 2024-03-11 PROCEDURE — 83735 ASSAY OF MAGNESIUM: CPT | Performed by: EMERGENCY MEDICINE

## 2024-03-11 PROCEDURE — 36415 COLL VENOUS BLD VENIPUNCTURE: CPT

## 2024-03-11 PROCEDURE — 81025 URINE PREGNANCY TEST: CPT

## 2024-03-11 PROCEDURE — 82962 GLUCOSE BLOOD TEST: CPT

## 2024-03-11 PROCEDURE — 99284 EMERGENCY DEPT VISIT MOD MDM: CPT

## 2024-03-11 PROCEDURE — 99285 EMERGENCY DEPT VISIT HI MDM: CPT

## 2024-03-11 PROCEDURE — 84443 ASSAY THYROID STIM HORMONE: CPT | Performed by: EMERGENCY MEDICINE

## 2024-03-11 PROCEDURE — 71045 X-RAY EXAM CHEST 1 VIEW: CPT | Performed by: EMERGENCY MEDICINE

## 2024-03-11 PROCEDURE — 85610 PROTHROMBIN TIME: CPT | Performed by: EMERGENCY MEDICINE

## 2024-03-11 PROCEDURE — 93005 ELECTROCARDIOGRAM TRACING: CPT

## 2024-03-11 PROCEDURE — 81001 URINALYSIS AUTO W/SCOPE: CPT | Performed by: EMERGENCY MEDICINE

## 2024-03-11 PROCEDURE — 87086 URINE CULTURE/COLONY COUNT: CPT | Performed by: EMERGENCY MEDICINE

## 2024-03-11 PROCEDURE — 84484 ASSAY OF TROPONIN QUANT: CPT | Performed by: EMERGENCY MEDICINE

## 2024-03-11 PROCEDURE — 93010 ELECTROCARDIOGRAM REPORT: CPT

## 2024-03-11 PROCEDURE — 85025 COMPLETE CBC W/AUTO DIFF WBC: CPT | Performed by: EMERGENCY MEDICINE

## 2024-03-11 PROCEDURE — 80053 COMPREHEN METABOLIC PANEL: CPT | Performed by: EMERGENCY MEDICINE

## 2024-03-11 PROCEDURE — 70551 MRI BRAIN STEM W/O DYE: CPT | Performed by: EMERGENCY MEDICINE

## 2024-03-11 PROCEDURE — 70450 CT HEAD/BRAIN W/O DYE: CPT | Performed by: EMERGENCY MEDICINE

## 2024-03-11 PROCEDURE — 85730 THROMBOPLASTIN TIME PARTIAL: CPT | Performed by: EMERGENCY MEDICINE

## 2024-03-11 RX ORDER — IOHEXOL 350 MG/ML
60 INJECTION, SOLUTION INTRAVENOUS
Status: COMPLETED | OUTPATIENT
Start: 2024-03-11 | End: 2024-03-11

## 2024-03-11 RX ORDER — LABETALOL HYDROCHLORIDE 5 MG/ML
10 INJECTION, SOLUTION INTRAVENOUS EVERY 10 MIN PRN
OUTPATIENT
Start: 2024-03-11

## 2024-03-11 RX ORDER — SODIUM CHLORIDE 9 MG/ML
INJECTION, SOLUTION INTRAVENOUS CONTINUOUS
OUTPATIENT
Start: 2024-03-11 | End: 2024-03-13

## 2024-03-11 RX ORDER — ONDANSETRON 2 MG/ML
4 INJECTION INTRAMUSCULAR; INTRAVENOUS EVERY 6 HOURS PRN
OUTPATIENT
Start: 2024-03-11

## 2024-03-11 RX ORDER — ASPIRIN 325 MG
325 TABLET ORAL DAILY
OUTPATIENT
Start: 2024-03-12

## 2024-03-11 RX ORDER — ACETAMINOPHEN 325 MG/1
650 TABLET ORAL EVERY 4 HOURS PRN
OUTPATIENT
Start: 2024-03-11

## 2024-03-11 RX ORDER — ACETAMINOPHEN 650 MG/1
650 SUPPOSITORY RECTAL EVERY 4 HOURS PRN
OUTPATIENT
Start: 2024-03-11

## 2024-03-11 RX ORDER — PROCHLORPERAZINE EDISYLATE 5 MG/ML
5 INJECTION INTRAMUSCULAR; INTRAVENOUS EVERY 8 HOURS PRN
OUTPATIENT
Start: 2024-03-11

## 2024-03-11 RX ORDER — ASPIRIN 300 MG/1
300 SUPPOSITORY RECTAL DAILY
OUTPATIENT
Start: 2024-03-12

## 2024-03-11 RX ORDER — HYDRALAZINE HYDROCHLORIDE 20 MG/ML
10 INJECTION INTRAMUSCULAR; INTRAVENOUS EVERY 2 HOUR PRN
OUTPATIENT
Start: 2024-03-11

## 2024-03-11 RX ORDER — ASPIRIN 81 MG/1
324 TABLET, CHEWABLE ORAL ONCE
Status: COMPLETED | OUTPATIENT
Start: 2024-03-11 | End: 2024-03-11

## 2024-03-11 RX ORDER — ATORVASTATIN CALCIUM 40 MG/1
40 TABLET, FILM COATED ORAL NIGHTLY
OUTPATIENT
Start: 2024-03-11

## 2024-03-11 NOTE — SIGNIFICANT EVENT
Stroke Alert initiated ED  Last Know Normal at 0300 am  Pre-morbid MRS 0  Initial NIHSS 2 including L sided weakness and decreased sensation to L side  Patient accompanied to CT dept  Repeat NIHSS completed back in ED room    NIH Stroke Scale  1a.  Level of consciousness: 0   1b. LOC questions:  0   1c. LOC commands: 0   2.  Best Gaze: 0   3. Visual: 0   4. Facial Palsy: 0   5a. Motor left arm: 0   5b.  Motor right arm: 0   6a. Motor left le - Drift   6b.  Motor right le   7. Limb Ataxia: 0   8.  Sensory: 1 - Decreased sensation to L side   9. Best Language:  0   10. Dysarthria: 0   11. Extinction and Inattention: 0     Total:   2      Other symptoms include N/A     Case discussed with Dr Stokes, ED  Patient educated on stroke diagnosis, treatment, plan of care, and what to expect during hospitalization; all pertinent questions and concerns addressed    Of note: History and LKW provided by patient. Patient has no medical history and does not take medications. She is independent with her ADLs. Patient went to bed this morning at 0300 am. She woke around noon with L sided symptoms. Patient denies dizziness or visual changes. Speech is intact.      Stroke Alert timing affected by: N/A    Please refer to the Stroke Data Flowsheets for additional information and Stroke Alert response times.

## 2024-03-11 NOTE — ED QUICK NOTES
Back to room D0 from CT. On way back, pt states \"she was up last night celebrating his son's birthday. Pt went to bed at 3 am and woke up around noon. She woke up with weakness to left leg and dizziness.\"    LWK 3 am last night.

## 2024-03-11 NOTE — ED PROVIDER NOTES
Patient Seen in: [facility] Emergency Department      History     Chief Complaint   Patient presents with    Numbness Weakness     Stated Complaint: left side numbness    Subjective:   28-year-old female, denies chronic past medical history, states he takes no medications, presents from the waiting room as a code stroke.  Patient states he went about around 3:00 this morning.  Woke up at noon and had some dizziness and mild headache and some left arm weakness and numbness.  Then developed some left leg weakness and came here for evaluation.  Upon my initial assessment she had very minimal left lower extremity drift given her NIH of 1.  She went to CT per stroke protocol. Stroke neuro Dr Jacob aware, no TNK, would like MRI          Objective:   Past Medical History:   Diagnosis Date    Essential hypertension     Morbid obesity with BMI of 40.0-44.9, adult ([facility]) 2023    Pre-eclampsia ([facility]) 2017              Past Surgical History:   Procedure Laterality Date      2017    REDUCTION OF LARGE BREAST                  Social History     Socioeconomic History    Marital status: Single   Tobacco Use    Smoking status: Never    Smokeless tobacco: Never   Vaping Use    Vaping Use: Never used   Substance and Sexual Activity    Alcohol use: Yes     Comment: socially    Drug use: Yes     Types: Cannabis     Comment: socially              Review of Systems   Constitutional:  Negative for fever.   Eyes: Negative.    Respiratory: Negative.     Cardiovascular: Negative.    Gastrointestinal: Negative.    Endocrine: Negative.    Genitourinary: Negative.    Musculoskeletal: Negative.    Neurological:  Positive for dizziness, weakness, numbness and headaches. Negative for syncope and speech difficulty.   Hematological:  Does not bruise/bleed easily.   All other systems reviewed and are negative.      Positive for stated complaint: left side numbness  Other systems are as noted in HPI.  Constitutional and vital  signs reviewed.      All other systems reviewed and negative except as noted above.    Physical Exam     ED Triage Vitals [03/11/24 1558]   BP (!) 155/102   Pulse 93   Resp 16   Temp 98.2 °F (36.8 °C)   Temp src Temporal   SpO2 100 %   O2 Device None (Room air)       Current:/75   Pulse 69   Temp 98.2 °F (36.8 °C) (Temporal)   Resp 19   Wt 107.2 kg   LMP 03/09/2024 (Exact Date)   SpO2 100%   BMI 41.86 kg/m²         Physical Exam  Vitals and nursing note reviewed.   Constitutional:       General: She is not in acute distress.     Appearance: She is well-developed. She is not ill-appearing, toxic-appearing or diaphoretic.   HENT:      Head: Normocephalic and atraumatic.   Eyes:      General: No visual field deficit.     Extraocular Movements: Extraocular movements intact.      Right eye: Normal extraocular motion and no nystagmus.      Left eye: Normal extraocular motion and no nystagmus.      Pupils: Pupils are equal, round, and reactive to light.   Cardiovascular:      Rate and Rhythm: Normal rate and regular rhythm.      Heart sounds: Normal heart sounds.   Pulmonary:      Effort: Pulmonary effort is normal. No respiratory distress.      Breath sounds: Normal breath sounds.   Abdominal:      Palpations: Abdomen is soft.   Musculoskeletal:         General: Normal range of motion.      Cervical back: Normal range of motion and neck supple.   Skin:     General: Skin is warm and dry.   Neurological:      Mental Status: She is alert.      GCS: GCS eye subscore is 4. GCS verbal subscore is 5. GCS motor subscore is 6.      Cranial Nerves: No cranial nerve deficit, dysarthria or facial asymmetry.      Sensory: Sensory deficit present.      Motor: Weakness present.      Deep Tendon Reflexes: Reflexes normal.   Psychiatric:         Mood and Affect: Mood normal.         Behavior: Behavior normal.         NIHSS 1 on my exam, 2 for stroke navigator for some sensory changes      ED Course     Labs Reviewed   COMP  METABOLIC PANEL (14) - Abnormal; Notable for the following components:       Result Value    AST 11 (*)     Alkaline Phosphatase 103 (*)     A/G Ratio 0.9 (*)     All other components within normal limits   URINALYSIS WITH CULTURE REFLEX - Abnormal; Notable for the following components:    Urine Color Colorless (*)     Spec Gravity >1.030 (*)     Blood Urine 3+ (*)     Protein Urine Trace (*)     Leukocyte Esterase Urine 75 (*)     WBC Urine 11-20 (*)     RBC Urine >10 (*)     Squamous Epi. Cells Few (*)     All other components within normal limits   POCT GLUCOSE - Abnormal; Notable for the following components:    POC Glucose 114 (*)     All other components within normal limits   CBC W/ DIFFERENTIAL - Abnormal; Notable for the following components:    HGB 11.8 (*)     MCV 70.9 (*)     MCH 23.2 (*)     All other components within normal limits   PROTHROMBIN TIME (PT) - Normal   PTT, ACTIVATED - Normal   MAGNESIUM - Normal   TSH W REFLEX TO FREE T4 - Normal   TROPONIN I HIGH SENSITIVITY - Normal   POCT GLUCOSE - Normal   POCT PREGNANCY URINE - Normal   CBC WITH DIFFERENTIAL WITH PLATELET    Narrative:     The following orders were created for panel order CBC With Differential With Platelet.  Procedure                               Abnormality         Status                     ---------                               -----------         ------                     CBC W/ DIFFERENTIAL[[id number]]          Abnormal            Final result                 Please view results for these tests on the individual orders.   RAINBOW DRAW LAVENDER   RAINBOW DRAW LIGHT GREEN   RAINBOW DRAW BLUE   RAINBOW DRAW GOLD   URINE CULTURE, ROUTINE     EKG    Rate, intervals and axes as noted on EKG Report.  Rate: 85  Rhythm: Sinus Rhythm  Reading: Sinus rhythm 85 bpm.  Normal axis.  No ST elevations.  Intervals are stable.  Inverted T wave in lead III.  No previous EKGs to view to compare to    Patient placed on cardiac monitor for  telemetry monitoring secondary to code stroke. Interpretation at bedside by me is sinus rhythm.             TNK/ NI Documentation:    Date/Time last known well:   N/A    NIHSS on presentation: N/A     Chief Complaint   Patient presents with    Numbness Weakness     IV Tenecteplase (TNK) administered: no    Candidate for Endovascular thrombectomy (EVT): no lvo     Disposition: There is no disposition on file for this visit.                  MDM      MRI BRAIN (CPT=70551)    Result Date: 3/11/2024  CONCLUSION:  No acute intracranial abnormality.   LOCATION:  [facility]   Dictated by (CST): Steve Villareal MD on 3/11/2024 at 7:14 PM     Finalized by (CST): Steve Villareal MD on 3/11/2024 at 7:16 PM       XR CHEST AP PORTABLE  (CPT=71045)    Result Date: 3/11/2024  PROCEDURE:  XR CHEST AP PORTABLE  (CPT=71045)  TECHNIQUE:  AP chest radiograph was obtained.  COMPARISON:  None.  INDICATIONS:  left side numbness  PATIENT STATED HISTORY: (As transcribed by Technologist)  Pt. with left side numbness, weakness.   FINDINGS: Cardiac silhouette and pulmonary vasculature are unremarkable. No consolidation, pleural effusion or pneumothorax. IMPRESSION: Unremarkable portable chest radiograph.   LOCATION:  [facility]      Dictated by (CST): Francois Sung MD on 3/11/2024 at 5:35 PM     Finalized by (CST): Francois Sung MD on 3/11/2024 at 5:36 PM       CT STROKE CTA BRAIN/CTA NECK (W IV)(CPT=70496/12548)    Result Date: 3/11/2024  CONCLUSION:   1. No large vessel occlusion is identified.  2. No intracranial dural sinus thrombosis is identified.  Apparent narrowing is seen at the distal aspect of the bilateral transverse sinuses.  This finding can be seen in the setting of idiopathic intracranial hypertension.  Clinical correlation with the patient's symptoms and medical history is suggested.  3. Dental caries noted.  Clinical correlation with dental exam is recommended.   Critical results were discussed with Dr. Stokes at 1656 hours  on 3/11/2024. Critical results were read back.    LOCATION:  [facility]   Dictated by (CST): Stromberg, LeRoy, MD on 3/11/2024 at 4:43 PM     Finalized by (CST): Stromberg, LeRoy, MD on 3/11/2024 at 4:57 PM       CT STROKE BRAIN (NO IV)(CPT=70450)    Result Date: 3/11/2024  CONCLUSION:  No acute intracranial abnormality. If there is clinical concern for acute ischemia/infarction, an MRI of the brain would be recommended for further evaluation.   Critical results were discussed with Dr. Stokes at 1629 hours on 3/11/2024. Critical results were read back.   LOCATION:  [facility]   Dictated by (CST): Stromberg, LeRoy, MD on 3/11/2024 at 4:25 PM     Finalized by (CST): Stromberg, LeRoy, MD on 3/11/2024 at 4:30 PM        I independently interpreted the CT of the brain without any obvious signs of acute hemorrhage    Differential diagnosis includes, but is not limited to, CVA, TIA, migraine, dehydration, electrolyte disturbance    Some information obtained family upon arrival regarding history presenting illness    External chart review demonstrates outpatient VNA clinic in July of last year, nothing more recent review to compare to    28-year-old female came in as a code stroke.  NIH of 1, stroke neuro very out of 2 for some sensory changes.  All resolved.  Repeat neuroexam is normal.  NIH is 0.  Stroke neurologist involved and aware.  MRI requested negative.  We discussed with neurology, cleared for discharge home.  Receiving aspirin here.  Outpatient follow-up with neurology and she was given outpatient follow-up instructions.  Patient is well-appearing and nontoxic, blood pressure normalized.  Resting in no distress.  Discussed everything with her.  Discharge home at her request, shared decision making utilized, return precaution provided      Patient was screened and evaluated during this visit.  As the treating physician attending to the patient, I determined within reasonable clinical confidence and prior to discharge, that  an emergency medical condition was not or was no longer present.  There was no indication for further evaluation, treatment, or admission on an emergency basis.  Comprehensive verbal and written discharge and follow-up instructions were provided to help prevent relapse or worsening.  Patient was instructed to follow-up with their primary care provider for further evaluation and treatment, return immediately to ER for worsening, concerning, new, or changing/persisting symptoms. I discussed the case with the patient and they had no questions, complaints, or concerns.  Patient was comfortable going home.     Per the discharge paperwork, patients are encouraged to and given instructions on how to [facility], where they have access to their records, including any/all incidental findings.     This note was prepared using Dragon Medical voice recognition dictation software. As a result errors may occur. When identified these errors have been corrected. While every attempt is made to correct errors during dictation discrepancies may still exist    Note to patient: The 21st Century Cures Act makes medical notes like these available to patients in the interest of transparency. However, this is a medical document intended as peer to peer communication. It is written in medical language and may contain abbreviations or verbiage that are unfamiliar. It may appear blunt or direct. Medical documents are intended to carry relevant information, facts as evident, and the clinical opinion of the practitioner.                                  Medical Decision Making      Disposition and Plan     Clinical Impression:  1. Stroke-like symptoms    2. Elevated blood pressure reading         Disposition:  Discharge  3/11/2024  7:26 pm    Follow-up:  [facility], [place] [address] [place] [address] [place] [id number] [phone]  Call  choose option 1 for general neurology and state  that you are following up for TIA          Medications Prescribed:  There are no discharge medications for this patient.

## 2024-03-12 ENCOUNTER — TELEPHONE (OUTPATIENT)
Dept: NEUROLOGY | Facility: CLINIC | Age: 29
End: 2024-03-12

## 2024-03-12 NOTE — TELEPHONE ENCOUNTER
TIA CLINIC SCREENING    1. Date of ED visit/TIA diagnosis:  03/11/2024   Time of discharge from ED:  1942    2. Is patient currently admitted?  No   If YES - TIA Clinic Appointment not required.      3. Does patient already see an [facility] neurologist?  No  Name:     (if YES - TIA Clinic Appointment not required.  Route message on to patient's neurologist)    4. Patient's current anti-platelet therapy:  81mg ASA    5. Patient's current statin therapy:  none    6. Has 2D Echo with bubble test been done?  No  Date:      7. Is TIA Clinic Appointment indicated?  Yes     If YES - route encounter to [facility] to schedule patient for appointment NO LATER THAN 48 HOURS AFTER ED DISCHARGE.     If UNSURE - route encounter to clinic provider for recommendation.     If NO - indicate reason and close encounter:  N/A

## 2024-03-12 NOTE — TELEPHONE ENCOUNTER
Called patient to discuss TIA appt.  While speaking to pt phone .     Attemped to call again. No answer will need to call pt again.

## 2024-03-13 LAB
ATRIAL RATE: 85 BPM
P AXIS: 63 DEGREES
P-R INTERVAL: 148 MS
Q-T INTERVAL: 346 MS
QRS DURATION: 82 MS
QTC CALCULATION (BEZET): 411 MS
R AXIS: 65 DEGREES
T AXIS: 11 DEGREES
VENTRICULAR RATE: 85 BPM

## 2024-03-14 NOTE — PROGRESS NOTES
ED Culture Callback Results Review    Pharmacist reviewed culture results from ED visit .    Final urine culture positive for untreated Escherichia coli.    No treatment is necessary at this time as culture is suggestive of asymptomatic bacteriuria rather than active infection as discussed with Dr. Hughes.      Jeet Figueroa, PharmD  Emergency Medicine Pharmacist Specialist  03/14/24; 2:56 PM